# Patient Record
Sex: MALE | Race: OTHER | NOT HISPANIC OR LATINO | URBAN - METROPOLITAN AREA
[De-identification: names, ages, dates, MRNs, and addresses within clinical notes are randomized per-mention and may not be internally consistent; named-entity substitution may affect disease eponyms.]

---

## 2020-03-29 ENCOUNTER — INPATIENT (INPATIENT)
Facility: HOSPITAL | Age: 70
LOS: 4 days | Discharge: HOME | End: 2020-04-03
Attending: STUDENT IN AN ORGANIZED HEALTH CARE EDUCATION/TRAINING PROGRAM | Admitting: STUDENT IN AN ORGANIZED HEALTH CARE EDUCATION/TRAINING PROGRAM
Payer: MEDICARE

## 2020-03-29 VITALS
SYSTOLIC BLOOD PRESSURE: 119 MMHG | HEART RATE: 103 BPM | TEMPERATURE: 102 F | DIASTOLIC BLOOD PRESSURE: 65 MMHG | RESPIRATION RATE: 18 BRPM | OXYGEN SATURATION: 94 %

## 2020-03-29 DIAGNOSIS — R05 COUGH: ICD-10-CM

## 2020-03-29 LAB
ALBUMIN SERPL ELPH-MCNC: 3.7 G/DL — SIGNIFICANT CHANGE UP (ref 3.5–5.2)
ALP SERPL-CCNC: 51 U/L — SIGNIFICANT CHANGE UP (ref 30–115)
ALT FLD-CCNC: 32 U/L — SIGNIFICANT CHANGE UP (ref 0–41)
ANION GAP SERPL CALC-SCNC: 16 MMOL/L — HIGH (ref 7–14)
AST SERPL-CCNC: 58 U/L — HIGH (ref 0–41)
BASE EXCESS BLDV CALC-SCNC: 0.4 MMOL/L — SIGNIFICANT CHANGE UP (ref -2–2)
BASOPHILS # BLD AUTO: 0 K/UL — SIGNIFICANT CHANGE UP (ref 0–0.2)
BASOPHILS NFR BLD AUTO: 0 % — SIGNIFICANT CHANGE UP (ref 0–1)
BILIRUB SERPL-MCNC: 0.7 MG/DL — SIGNIFICANT CHANGE UP (ref 0.2–1.2)
BUN SERPL-MCNC: 16 MG/DL — SIGNIFICANT CHANGE UP (ref 10–20)
CA-I SERPL-SCNC: 1.1 MMOL/L — LOW (ref 1.12–1.3)
CALCIUM SERPL-MCNC: 8.2 MG/DL — LOW (ref 8.5–10.1)
CHLORIDE SERPL-SCNC: 98 MMOL/L — SIGNIFICANT CHANGE UP (ref 98–110)
CO2 SERPL-SCNC: 19 MMOL/L — SIGNIFICANT CHANGE UP (ref 17–32)
CREAT SERPL-MCNC: 0.8 MG/DL — SIGNIFICANT CHANGE UP (ref 0.7–1.5)
EOSINOPHIL # BLD AUTO: 0 K/UL — SIGNIFICANT CHANGE UP (ref 0–0.7)
EOSINOPHIL NFR BLD AUTO: 0 % — SIGNIFICANT CHANGE UP (ref 0–8)
FLU A RESULT: NEGATIVE — SIGNIFICANT CHANGE UP
FLU A RESULT: NEGATIVE — SIGNIFICANT CHANGE UP
FLUAV AG NPH QL: NEGATIVE — SIGNIFICANT CHANGE UP
FLUBV AG NPH QL: NEGATIVE — SIGNIFICANT CHANGE UP
GAS PNL BLDV: 132 MMOL/L — LOW (ref 136–145)
GAS PNL BLDV: SIGNIFICANT CHANGE UP
GLUCOSE SERPL-MCNC: 138 MG/DL — HIGH (ref 70–99)
HCO3 BLDV-SCNC: 23 MMOL/L — SIGNIFICANT CHANGE UP (ref 22–29)
HCT VFR BLD CALC: 40.4 % — LOW (ref 42–52)
HCT VFR BLDA CALC: 44.9 % — HIGH (ref 34–44)
HGB BLD CALC-MCNC: 14.7 G/DL — SIGNIFICANT CHANGE UP (ref 14–18)
HGB BLD-MCNC: 14.4 G/DL — SIGNIFICANT CHANGE UP (ref 14–18)
IMM GRANULOCYTES NFR BLD AUTO: 0.3 % — SIGNIFICANT CHANGE UP (ref 0.1–0.3)
LACTATE BLDV-MCNC: 1.3 MMOL/L — SIGNIFICANT CHANGE UP (ref 0.5–1.6)
LYMPHOCYTES # BLD AUTO: 0.23 K/UL — LOW (ref 1.2–3.4)
LYMPHOCYTES # BLD AUTO: 3.3 % — LOW (ref 20.5–51.1)
MAGNESIUM SERPL-MCNC: 2 MG/DL — SIGNIFICANT CHANGE UP (ref 1.8–2.4)
MCHC RBC-ENTMCNC: 30.6 PG — SIGNIFICANT CHANGE UP (ref 27–31)
MCHC RBC-ENTMCNC: 35.6 G/DL — SIGNIFICANT CHANGE UP (ref 32–37)
MCV RBC AUTO: 85.8 FL — SIGNIFICANT CHANGE UP (ref 80–94)
MONOCYTES # BLD AUTO: 0.42 K/UL — SIGNIFICANT CHANGE UP (ref 0.1–0.6)
MONOCYTES NFR BLD AUTO: 6 % — SIGNIFICANT CHANGE UP (ref 1.7–9.3)
NEUTROPHILS # BLD AUTO: 6.34 K/UL — SIGNIFICANT CHANGE UP (ref 1.4–6.5)
NEUTROPHILS NFR BLD AUTO: 90.4 % — HIGH (ref 42.2–75.2)
NRBC # BLD: 0 /100 WBCS — SIGNIFICANT CHANGE UP (ref 0–0)
NT-PROBNP SERPL-SCNC: 130 PG/ML — SIGNIFICANT CHANGE UP (ref 0–300)
PCO2 BLDV: 31 MMHG — LOW (ref 41–51)
PH BLDV: 7.48 — HIGH (ref 7.26–7.43)
PLATELET # BLD AUTO: 197 K/UL — SIGNIFICANT CHANGE UP (ref 130–400)
PO2 BLDV: 100 MMHG — HIGH (ref 20–40)
POTASSIUM BLDV-SCNC: 3.7 MMOL/L — SIGNIFICANT CHANGE UP (ref 3.3–5.6)
POTASSIUM SERPL-MCNC: 4.5 MMOL/L — SIGNIFICANT CHANGE UP (ref 3.5–5)
POTASSIUM SERPL-SCNC: 4.5 MMOL/L — SIGNIFICANT CHANGE UP (ref 3.5–5)
PROT SERPL-MCNC: 7 G/DL — SIGNIFICANT CHANGE UP (ref 6–8)
RBC # BLD: 4.71 M/UL — SIGNIFICANT CHANGE UP (ref 4.7–6.1)
RBC # FLD: 12.1 % — SIGNIFICANT CHANGE UP (ref 11.5–14.5)
RSV RESULT: NEGATIVE — SIGNIFICANT CHANGE UP
RSV RNA RESP QL NAA+PROBE: NEGATIVE — SIGNIFICANT CHANGE UP
SAO2 % BLDV: 97 % — SIGNIFICANT CHANGE UP
SODIUM SERPL-SCNC: 133 MMOL/L — LOW (ref 135–146)
TROPONIN T SERPL-MCNC: <0.01 NG/ML — SIGNIFICANT CHANGE UP
WBC # BLD: 7.01 K/UL — SIGNIFICANT CHANGE UP (ref 4.8–10.8)
WBC # FLD AUTO: 7.01 K/UL — SIGNIFICANT CHANGE UP (ref 4.8–10.8)

## 2020-03-29 PROCEDURE — 93010 ELECTROCARDIOGRAM REPORT: CPT

## 2020-03-29 PROCEDURE — 71045 X-RAY EXAM CHEST 1 VIEW: CPT | Mod: 26

## 2020-03-29 PROCEDURE — 99285 EMERGENCY DEPT VISIT HI MDM: CPT

## 2020-03-29 RX ORDER — AZITHROMYCIN 500 MG/1
500 TABLET, FILM COATED ORAL EVERY 24 HOURS
Refills: 0 | Status: DISCONTINUED | OUTPATIENT
Start: 2020-03-29 | End: 2020-03-29

## 2020-03-29 RX ORDER — CEFTRIAXONE 500 MG/1
1000 INJECTION, POWDER, FOR SOLUTION INTRAMUSCULAR; INTRAVENOUS EVERY 24 HOURS
Refills: 0 | Status: DISCONTINUED | OUTPATIENT
Start: 2020-03-29 | End: 2020-04-01

## 2020-03-29 RX ORDER — CEFTRIAXONE 500 MG/1
1000 INJECTION, POWDER, FOR SOLUTION INTRAMUSCULAR; INTRAVENOUS ONCE
Refills: 0 | Status: COMPLETED | OUTPATIENT
Start: 2020-03-29 | End: 2020-03-29

## 2020-03-29 RX ORDER — AZITHROMYCIN 500 MG/1
500 TABLET, FILM COATED ORAL ONCE
Refills: 0 | Status: COMPLETED | OUTPATIENT
Start: 2020-03-29 | End: 2020-03-29

## 2020-03-29 RX ORDER — ENOXAPARIN SODIUM 100 MG/ML
40 INJECTION SUBCUTANEOUS DAILY
Refills: 0 | Status: DISCONTINUED | OUTPATIENT
Start: 2020-03-29 | End: 2020-04-03

## 2020-03-29 RX ORDER — HYDROXYCHLOROQUINE SULFATE 200 MG
400 TABLET ORAL EVERY 12 HOURS
Refills: 0 | Status: COMPLETED | OUTPATIENT
Start: 2020-03-29 | End: 2020-03-30

## 2020-03-29 RX ORDER — HYDROXYCHLOROQUINE SULFATE 200 MG
TABLET ORAL
Refills: 0 | Status: DISCONTINUED | OUTPATIENT
Start: 2020-03-29 | End: 2020-03-30

## 2020-03-29 RX ORDER — HYDROXYCHLOROQUINE SULFATE 200 MG
200 TABLET ORAL EVERY 12 HOURS
Refills: 0 | Status: DISCONTINUED | OUTPATIENT
Start: 2020-03-30 | End: 2020-03-30

## 2020-03-29 RX ORDER — ACETAMINOPHEN 500 MG
650 TABLET ORAL ONCE
Refills: 0 | Status: COMPLETED | OUTPATIENT
Start: 2020-03-29 | End: 2020-03-29

## 2020-03-29 RX ORDER — SODIUM CHLORIDE 9 MG/ML
1000 INJECTION INTRAMUSCULAR; INTRAVENOUS; SUBCUTANEOUS
Refills: 0 | Status: DISCONTINUED | OUTPATIENT
Start: 2020-03-29 | End: 2020-04-03

## 2020-03-29 RX ORDER — SODIUM CHLORIDE 9 MG/ML
1000 INJECTION, SOLUTION INTRAVENOUS ONCE
Refills: 0 | Status: COMPLETED | OUTPATIENT
Start: 2020-03-29 | End: 2020-03-29

## 2020-03-29 RX ADMIN — ENOXAPARIN SODIUM 40 MILLIGRAM(S): 100 INJECTION SUBCUTANEOUS at 22:57

## 2020-03-29 RX ADMIN — Medication 650 MILLIGRAM(S): at 08:47

## 2020-03-29 RX ADMIN — AZITHROMYCIN 255 MILLIGRAM(S): 500 TABLET, FILM COATED ORAL at 10:41

## 2020-03-29 RX ADMIN — SODIUM CHLORIDE 75 MILLILITER(S): 9 INJECTION INTRAMUSCULAR; INTRAVENOUS; SUBCUTANEOUS at 17:28

## 2020-03-29 RX ADMIN — CEFTRIAXONE 100 MILLIGRAM(S): 500 INJECTION, POWDER, FOR SOLUTION INTRAMUSCULAR; INTRAVENOUS at 10:10

## 2020-03-29 RX ADMIN — CEFTRIAXONE 1000 MILLIGRAM(S): 500 INJECTION, POWDER, FOR SOLUTION INTRAMUSCULAR; INTRAVENOUS at 10:37

## 2020-03-29 RX ADMIN — SODIUM CHLORIDE 1000 MILLILITER(S): 9 INJECTION, SOLUTION INTRAVENOUS at 10:37

## 2020-03-29 RX ADMIN — Medication 400 MILLIGRAM(S): at 22:58

## 2020-03-29 NOTE — ED PROVIDER NOTE - NS ED ROS FT
Constitutional: + fever, chills.  Eyes: No visual changes.  ENT: No hearing changes. No sore throat.  Neck: No neck pain or stiffness.  Cardiovascular: No chest pain, palpitations, edema.  Pulmonary: + SOB, cough. No hemoptysis.  Abdominal: No abdominal pain, nausea, vomiting, diarrhea.  : No dysuria, frequency.  Neuro: No headache, syncope, dizziness.  MS: No back pain. No calf pain/swelling.  Psych: No suicidal ideations.

## 2020-03-29 NOTE — H&P ADULT - NSHPLABSRESULTS_GEN_ALL_CORE
Complete Blood Count + Automated Diff (03.29.20 @ 09:14)    WBC Count: 7.01 K/uL    RBC Count: 4.71 M/uL    Hemoglobin: 14.4 g/dL    Hematocrit: 40.4 %    Mean Cell Volume: 85.8 fL    Mean Cell Hemoglobin: 30.6 pg    Mean Cell Hemoglobin Conc: 35.6 g/dL    Red Cell Distrib Width: 12.1 %    Platelet Count - Automated: 197 K/uL    Auto Neutrophil #: 6.34 K/uL    Auto Lymphocyte #: 0.23 K/uL    Auto Monocyte #: 0.42 K/uL    Auto Eosinophil #: 0.00 K/uL    Auto Basophil #: 0.00 K/uL    Auto Neutrophil %: 90.4: Differential percentages must be correlated with absolute numbers for  clinical significance. %    Auto Lymphocyte %: 3.3 %    Auto Monocyte %: 6.0 %    Auto Eosinophil %: 0.0 %    Auto Basophil %: 0.0 %    Auto Immature Granulocyte %: 0.3 %    Nucleated RBC: 0 /100 WBCs    Comprehensive Metabolic Panel (03.29.20 @ 09:14)    Sodium, Serum: 133 mmol/L    Potassium, Serum: 4.5: Slighty Hemolyzed use with Caution mmol/L    Chloride, Serum: 98 mmol/L    Carbon Dioxide, Serum: 19 mmol/L    Anion Gap, Serum: 16 mmol/L    Blood Urea Nitrogen, Serum: 16 mg/dL    Creatinine, Serum: 0.8 mg/dL    Glucose, Serum: 138 mg/dL    Calcium, Total Serum: 8.2 mg/dL    Protein Total, Serum: 7.0 g/dL    Albumin, Serum: 3.7 g/dL    Bilirubin Total, Serum: 0.7 mg/dL    Alkaline Phosphatase, Serum: 51 U/L    Aspartate Aminotransferase (AST/SGOT): 58: Hemolyzed. Interpret with caution U/L    Alanine Aminotransferase (ALT/SGPT): 32 U/L    eGFR if Non : 89: Interpretative comment  The units for eGFR are mL/min/1.73M2 (normalized body surface area). The  eGFR is calculated from a serum creatinine using the CKD-EPI equation.  Other variables required for calculation are race, age and sex. Among  patients with chronic kidney disease (CKD), the eGFR is useful in  determining the stage of disease according to KDOQI CKD classification.  All eGFR results are reported numerically with the following  interpretation.            < from: Xray Chest 1 View-PORTABLE IMMEDIATE (03.29.20 @ 09:58) >    Impression:      Peripheral patchy right mid to lowerlung field opacity. Findings are consistent with pneumonia in the proper clinical setting      < end of copied text >

## 2020-03-29 NOTE — H&P ADULT - ASSESSMENT
Patient is a 69 y/o M with no PMH presented with chief complaint of shortness of breath. Patient has been having fever, dry cough for the last 2 weeks.    ASSESSMENT AND PLAN:    #COVID 19  - ID consult   - O2 supplmentation to keep SpO2 > 92 %   - repeat chest x ray in AM or earlier if any signs of decompensation   - Follow-up immune hyperactivation panel : LDH, D-dimers, fibrinogen, ferritin, triglycerides, ESR, CRP, pro-calcitonin, blood cultures. Give one time dose of Tocilizumab if evidence of immune hyper-activation and decompensation / shock if OK with ID   - Consider Hydroxychloroquine 400 mg BID for 2 doses then 200 mg BID if OK with ID   - Continue IVF with LR @ 75 mL/h   - Tylenol 650 mg q6h PRN for fever   - Contact and Airborne precuations Patient is a 69 y/o M with no PMH presented with chief complaint of shortness of breath. Patient has been having fever, dry cough for the last 2 weeks with loss of appetite and SOB over the last week.     ASSESSMENT AND PLAN:    #COVID 19  - ID consult   - O2 supplementation to keep SpO2 > 92 %. currently on 2L NC  - Chest x ray showed right middle and lower lobe opacity  - repeat chest x ray in AM or earlier if any signs of decompensation   - Follow-up immune hyperactivation panel : LDH, D-dimers, fibrinogen, ferritin, triglycerides, ESR, CRP, pro-calcitonin, blood cultures.   - Started on Hydroxychloroquine 400 mg BID for 2 doses then 200 mg BID   - started on rocephin. EKG ordered  - Continue IVF with LR @ 75 mL/h   - Tylenol 650 mg q6h PRN for fever   - Contact and Airborne precautions    #Diet: Regular  #DVT ppx: lovenox  #GI ppx: not indicated  #Dispo: Acute

## 2020-03-29 NOTE — ED ADULT TRIAGE NOTE - CHIEF COMPLAINT QUOTE
brought to ED c/o difficulty breathing "for the last few days" ,decreased appetite, fatigue  unknown sick contacts

## 2020-03-29 NOTE — H&P ADULT - HISTORY OF PRESENT ILLNESS
Patient is a 69 y/o M with no PMH presented with chief complaint of shortness of breath. Patient has been having fever, dry cough for the last 2 weeks. Patient has been having shortness of breath on minimal exertion for the last week associated with high grade fevers, loss of appetite and myalgias. no c/o chest pain, hemoptysis. Patient has no travel outside the [place] in last few weeks. He only goes out for grocery shopping.   In ED, /65mm Hg, /min, 101.4F, 94% on RA. WBC 7 with 3% lymphocyte count. Flu panel negative. Chest x ray showed right middle and lower lobe opacity. COVID sent. s/p rocephin and azithromycin. Patient is a 69 y/o M with no PMH presented with chief complaint of shortness of breath. Patient has been having fever, dry cough for the last 2 weeks. Patient has been having shortness of breath on minimal exertion for the last week associated with high grade fevers, loss of appetite and myalgias. no c/o chest pain, hemoptysis. Patient has no travel outside the [place] in last few weeks but he traveled to [place] in September 2019. He only goes out for grocery shopping.   In ED, /65mm Hg, /min, 101.4F, 94% on RA. WBC 7 with 3% lymphocyte count. Flu panel negative. Chest x ray showed right middle and lower lobe opacity. COVID sent. s/p rocephin and azithromycin.

## 2020-03-29 NOTE — H&P ADULT - ATTENDING COMMENTS
Above note reviewed. Patient seen and examined   Patient was using oxygen. At the time of examinations. Oxygen were removed and patient did well without oxygen.   However patient is very lethargic and coughing using accessory muscle to breath.     Chest xray finding of bilateral pneumonia - C/W IV antibiotics for now and will monitor repeat chest xray tomorrow

## 2020-03-29 NOTE — ED PROVIDER NOTE - OBJECTIVE STATEMENT
Pt is a 69 y/o male with PMH of HTN, DLD who presents to ED for several days of SOB, moderate, constant, worse since onset. +cough, fever, weakness. No abd pain, n/v/d, chest pain. Hx obtained from pt and son.

## 2020-03-29 NOTE — ED ADULT NURSE REASSESSMENT NOTE - NS ED NURSE REASSESS COMMENT FT1
placed on full cm, placed on o2 2l nc for sats 90% on ra, now 97% on 2lnc - will continue to monitor

## 2020-03-29 NOTE — ED ADULT NURSE NOTE - OBJECTIVE STATEMENT
md HPI Objective Statement: Pt is a 71 y/o male with PMH of HTN, DLD who presents to ED for several days of SOB, moderate, constant, worse since onset. +cough, fever, weakness. No abd pain, n/v/d, chest pain. Hx obtained from pt and son.

## 2020-03-29 NOTE — ED ADULT NURSE NOTE - INTERVENTIONS DEFINITIONS
Call bell, personal items and telephone within reach/Instruct patient to call for assistance/Reinforce activity limits and safety measures with patient and family/Physically safe environment: no spills, clutter or unnecessary equipment

## 2020-03-29 NOTE — ED PROVIDER NOTE - CLINICAL SUMMARY MEDICAL DECISION MAKING FREE TEXT BOX
Pt with several medical problems presented to ED for SOB, cough, fever. LAbs, imaging obtained. Cultures sent, abx given. COVID testing sent, pt on isolation. Labs, imaging reviewed. Endorsed to medicine for further management.

## 2020-03-29 NOTE — ED PROVIDER NOTE - PHYSICAL EXAMINATION
Constitutional: Well developed, well nourished. NAD.  Head: Normocephalic, atraumatic.  Eyes: PERRL. EOMI.  ENT: No nasal discharge. Mucous membranes moist.  Neck: Supple. Painless ROM.  Cardiovascular: Normal S1, S2. Regular rate and rhythm. No murmurs, rubs, or gallops.  Pulmonary: Mild tachypnea. Lungs CTAB.  Abdominal: Soft. Nondistended. Nontender. No rebound, guarding, rigidity.  Extremities. Pelvis stable. No lower extremity edema, symmetric calves.  Skin: No rashes, cyanosis.  Neuro: AAOx3. No focal neurological deficits.  Psych: Normal mood. Normal affect.

## 2020-03-30 LAB
ALBUMIN SERPL ELPH-MCNC: 3.1 G/DL — LOW (ref 3.5–5.2)
ALP SERPL-CCNC: 46 U/L — SIGNIFICANT CHANGE UP (ref 30–115)
ALT FLD-CCNC: 27 U/L — SIGNIFICANT CHANGE UP (ref 0–41)
ANION GAP SERPL CALC-SCNC: 12 MMOL/L — SIGNIFICANT CHANGE UP (ref 7–14)
AST SERPL-CCNC: 41 U/L — SIGNIFICANT CHANGE UP (ref 0–41)
BILIRUB SERPL-MCNC: 0.4 MG/DL — SIGNIFICANT CHANGE UP (ref 0.2–1.2)
BUN SERPL-MCNC: 12 MG/DL — SIGNIFICANT CHANGE UP (ref 10–20)
CALCIUM SERPL-MCNC: 8.1 MG/DL — LOW (ref 8.5–10.1)
CHLORIDE SERPL-SCNC: 100 MMOL/L — SIGNIFICANT CHANGE UP (ref 98–110)
CO2 SERPL-SCNC: 24 MMOL/L — SIGNIFICANT CHANGE UP (ref 17–32)
CREAT SERPL-MCNC: 0.6 MG/DL — LOW (ref 0.7–1.5)
D DIMER BLD IA.RAPID-MCNC: 294 NG/ML DDU — HIGH (ref 0–230)
ERYTHROCYTE [SEDIMENTATION RATE] IN BLOOD: 8 MM/HR — SIGNIFICANT CHANGE UP (ref 0–10)
GLUCOSE SERPL-MCNC: 109 MG/DL — HIGH (ref 70–99)
HCT VFR BLD CALC: 38.7 % — LOW (ref 42–52)
HCV AB S/CO SERPL IA: 0.03 COI — SIGNIFICANT CHANGE UP
HCV AB SERPL-IMP: SIGNIFICANT CHANGE UP
HGB BLD-MCNC: 13.1 G/DL — LOW (ref 14–18)
LDH SERPL L TO P-CCNC: 246 U/L — HIGH (ref 50–242)
MAGNESIUM SERPL-MCNC: 2 MG/DL — SIGNIFICANT CHANGE UP (ref 1.8–2.4)
MCHC RBC-ENTMCNC: 29.1 PG — SIGNIFICANT CHANGE UP (ref 27–31)
MCHC RBC-ENTMCNC: 33.9 G/DL — SIGNIFICANT CHANGE UP (ref 32–37)
MCV RBC AUTO: 86 FL — SIGNIFICANT CHANGE UP (ref 80–94)
NRBC # BLD: 0 /100 WBCS — SIGNIFICANT CHANGE UP (ref 0–0)
PLATELET # BLD AUTO: 184 K/UL — SIGNIFICANT CHANGE UP (ref 130–400)
POTASSIUM SERPL-MCNC: 3.9 MMOL/L — SIGNIFICANT CHANGE UP (ref 3.5–5)
POTASSIUM SERPL-SCNC: 3.9 MMOL/L — SIGNIFICANT CHANGE UP (ref 3.5–5)
PROT SERPL-MCNC: 6 G/DL — SIGNIFICANT CHANGE UP (ref 6–8)
RBC # BLD: 4.5 M/UL — LOW (ref 4.7–6.1)
RBC # FLD: 12.1 % — SIGNIFICANT CHANGE UP (ref 11.5–14.5)
SARS-COV-2 RNA SPEC QL NAA+PROBE: SIGNIFICANT CHANGE UP
SODIUM SERPL-SCNC: 136 MMOL/L — SIGNIFICANT CHANGE UP (ref 135–146)
WBC # BLD: 5.02 K/UL — SIGNIFICANT CHANGE UP (ref 4.8–10.8)
WBC # FLD AUTO: 5.02 K/UL — SIGNIFICANT CHANGE UP (ref 4.8–10.8)

## 2020-03-30 PROCEDURE — 99223 1ST HOSP IP/OBS HIGH 75: CPT

## 2020-03-30 RX ORDER — ACETAMINOPHEN 500 MG
650 TABLET ORAL EVERY 6 HOURS
Refills: 0 | Status: DISCONTINUED | OUTPATIENT
Start: 2020-03-30 | End: 2020-04-03

## 2020-03-30 RX ADMIN — ENOXAPARIN SODIUM 40 MILLIGRAM(S): 100 INJECTION SUBCUTANEOUS at 12:48

## 2020-03-30 RX ADMIN — Medication 400 MILLIGRAM(S): at 05:14

## 2020-03-30 RX ADMIN — CEFTRIAXONE 100 MILLIGRAM(S): 500 INJECTION, POWDER, FOR SOLUTION INTRAMUSCULAR; INTRAVENOUS at 05:13

## 2020-03-30 NOTE — CONSULT NOTE ADULT - SUBJECTIVE AND OBJECTIVE BOX
KIERSTEN SANTANA  70y, Male  Allergy: No Known Allergies      All historical available data reviewed.    HPI:  Patient is a 71 y/o M with no PMH presented with chief complaint of shortness of breath. Patient has been having fever, dry cough for the last 2 weeks. Patient has been having shortness of breath on minimal exertion for the last week associated with high grade fevers, loss of appetite and myalgias. no c/o chest pain, hemoptysis. Patient has no travel outside the [place] in last few weeks but he traveled to [place] in September 2019. He only goes out for grocery shopping.   In ED, /65mm Hg, /min, 101.4F, 94% on RA. WBC 7 with 3% lymphocyte count. Flu panel negative. Chest x ray showed right middle and lower lobe opacity. COVID sent. s/p rocephin and azithromycin. (29 Mar 2020 16:22)    FAMILY HISTORY:    PAST MEDICAL & SURGICAL HISTORY:        VITALS:  T(F): 97.1, Max: 97.1 (03-29-20 @ 14:04)  HR: 70  BP: 115/76  RR: 22Vital Signs Last 24 Hrs  T(C): 36.2 (29 Mar 2020 14:04), Max: 36.2 (29 Mar 2020 14:04)  T(F): 97.1 (29 Mar 2020 14:04), Max: 97.1 (29 Mar 2020 14:04)  HR: 70 (29 Mar 2020 14:04) (70 - 97)  BP: 115/76 (29 Mar 2020 14:04) (109/72 - 115/76)  BP(mean): --  RR: 22 (29 Mar 2020 14:04) (20 - 22)  SpO2: 95% (29 Mar 2020 14:04) (91% - 97%)    TESTS & MEASUREMENTS:                        13.1   5.02  )-----------( 184      ( 30 Mar 2020 06:51 )             38.7     03-30    136  |  100  |  12  ----------------------------<  109<H>  3.9   |  24  |  0.6<L>    Ca    8.1<L>      30 Mar 2020 06:51  Mg     2.0     03-30    TPro  6.0  /  Alb  3.1<L>  /  TBili  0.4  /  DBili  x   /  AST  41  /  ALT  27  /  AlkPhos  46  03-30    LIVER FUNCTIONS - ( 30 Mar 2020 06:51 )  Alb: 3.1 g/dL / Pro: 6.0 g/dL / ALK PHOS: 46 U/L / ALT: 27 U/L / AST: 41 U/L / GGT: x                   RADIOLOGY & ADDITIONAL TESTS:  Personal review of radiological diagnostics performed  Echo and EKG results noted when applicable.     MEDICATIONS:  acetaminophen   Tablet .. 650 milliGRAM(s) Oral every 6 hours PRN  cefTRIAXone   IVPB 1000 milliGRAM(s) IV Intermittent every 24 hours  enoxaparin Injectable 40 milliGRAM(s) SubCutaneous daily  hydroxychloroquine   Oral   hydroxychloroquine 200 milliGRAM(s) Oral every 12 hours  sodium chloride 0.9%. 1000 milliLiter(s) IV Continuous <Continuous>      ANTIBIOTICS:  cefTRIAXone   IVPB 1000 milliGRAM(s) IV Intermittent every 24 hours  hydroxychloroquine   Oral   hydroxychloroquine 200 milliGRAM(s) Oral every 12 hours

## 2020-03-30 NOTE — PROGRESS NOTE ADULT - SUBJECTIVE AND OBJECTIVE BOX
Patient is a 71 y/o M with no PMH presented with chief complaint of shortness of breath.   Patient has been having fever, dry cough for the last 2 weeks. Patient has been having shortness of breath on minimal exertion for the last week associated with high grade fevers, loss of appetite and myalgias. no c/o chest pain, hemoptysis. Patient has no travel outside the [place] in last few weeks but he traveled to [place] in September 2019. He only goes out for grocery shopping.   He c/o cough with poor appetite and no sense of taste.  1st test for covid was neg on 3/29/2020.  He was seen by ID on 3/30, recommended no ABX -- Plaquenil and azithromycin were both d/c'd.  However, he is getting iv rocephin 1gm q24hr for CAP.    < from: Xray Chest 1 View-PORTABLE IMMEDIATE (03.29.20 @ 09:58) >    PROCEDURE DATE:  03/29/2020            INTERPRETATION:  Clinical History / Reason for exam: Cough and fever    Comparison : Chest radiograph none  Technique/Positioning: AP portable     Findings:    Support devices: None.    Cardiac/mediastinum/hilum: Tortuous aorta.    Lung parenchyma/Pleura: Peripheral patchy right mid and lower opacity. No pneumothorax    Skeleton/soft tissues: Unremarkable.    Impression:      Peripheral patchy right mid to lowerlung field opacity. Findings are consistent with pneumonia in the proper clinical setting    < end of copied text >    LABS:                          13.1   5.02  )-----------( 184      ( 30 Mar 2020 06:51 )             38.7     03-30    136  |  100  |  12  ----------------------------<  109<H>  3.9   |  24  |  0.6<L>    Ca    8.1<L>      30 Mar 2020 06:51  Mg     2.0     03-30    TPro  6.0  /  Alb  3.1<L>  /  TBili  0.4  /  DBili  x   /  AST  41  /  ALT  27  /  AlkPhos  46  03-30    PE:  VS  @16:30:      /73      HR 77      RR18    T96.1    pulse ox RA = 89%, pulse ox O2 via NC @ 2LPM = 99%  the patient is alert, oriented, NAD but is coughing a lot  Lungs:  + coarse rales RML and RLL, few rales LLL  Heart::   RR no murmur  Abdomen: soft, bs+, non-tender  Extremities:   no CCE    ---Discussed with hospitalist Dr. Krause re: covid negative swab on 3/29---since the patient is still symptomatic, he was swabbed again today to r/o covid      If negative, and patient still needs to be hospitalized for CAP, will discuss transfer to another unit that is covid neg.; if he is stable, covid neg and       remains afebrile, consider d/c home  ---In the meanwhile, continue IV rocephin and supportive rx  ---IV fluids since po intake is poor, he states he's only drinking water because nothing tastes right  ---monitor labs, VS, pulse ox---->try to wean off O2 if pulse ox remains in high 90's on O2

## 2020-03-31 LAB
ALBUMIN SERPL ELPH-MCNC: 3 G/DL — LOW (ref 3.5–5.2)
ALP SERPL-CCNC: 49 U/L — SIGNIFICANT CHANGE UP (ref 30–115)
ALT FLD-CCNC: 25 U/L — SIGNIFICANT CHANGE UP (ref 0–41)
ANION GAP SERPL CALC-SCNC: 12 MMOL/L — SIGNIFICANT CHANGE UP (ref 7–14)
AST SERPL-CCNC: 34 U/L — SIGNIFICANT CHANGE UP (ref 0–41)
BASOPHILS # BLD AUTO: 0 K/UL — SIGNIFICANT CHANGE UP (ref 0–0.2)
BASOPHILS NFR BLD AUTO: 0 % — SIGNIFICANT CHANGE UP (ref 0–1)
BILIRUB SERPL-MCNC: 0.5 MG/DL — SIGNIFICANT CHANGE UP (ref 0.2–1.2)
BUN SERPL-MCNC: 7 MG/DL — LOW (ref 10–20)
CALCIUM SERPL-MCNC: 8.1 MG/DL — LOW (ref 8.5–10.1)
CHLORIDE SERPL-SCNC: 103 MMOL/L — SIGNIFICANT CHANGE UP (ref 98–110)
CO2 SERPL-SCNC: 24 MMOL/L — SIGNIFICANT CHANGE UP (ref 17–32)
CREAT SERPL-MCNC: 0.6 MG/DL — LOW (ref 0.7–1.5)
CRP SERPL-MCNC: 7.35 MG/DL — HIGH (ref 0–0.4)
EOSINOPHIL # BLD AUTO: 0.05 K/UL — SIGNIFICANT CHANGE UP (ref 0–0.7)
EOSINOPHIL NFR BLD AUTO: 0.9 % — SIGNIFICANT CHANGE UP (ref 0–8)
GIANT PLATELETS BLD QL SMEAR: PRESENT — SIGNIFICANT CHANGE UP
GLUCOSE SERPL-MCNC: 99 MG/DL — SIGNIFICANT CHANGE UP (ref 70–99)
HCT VFR BLD CALC: 37.9 % — LOW (ref 42–52)
HGB BLD-MCNC: 13 G/DL — LOW (ref 14–18)
LYMPHOCYTES # BLD AUTO: 0.14 K/UL — LOW (ref 1.2–3.4)
LYMPHOCYTES # BLD AUTO: 2.6 % — LOW (ref 20.5–51.1)
MANUAL SMEAR VERIFICATION: SIGNIFICANT CHANGE UP
MCHC RBC-ENTMCNC: 30.2 PG — SIGNIFICANT CHANGE UP (ref 27–31)
MCHC RBC-ENTMCNC: 34.3 G/DL — SIGNIFICANT CHANGE UP (ref 32–37)
MCV RBC AUTO: 88.1 FL — SIGNIFICANT CHANGE UP (ref 80–94)
MONOCYTES # BLD AUTO: 0.64 K/UL — HIGH (ref 0.1–0.6)
MONOCYTES NFR BLD AUTO: 12.2 % — HIGH (ref 1.7–9.3)
MYELOCYTES NFR BLD: 1.7 % — HIGH (ref 0–0)
NEUTROPHILS # BLD AUTO: 4.13 K/UL — SIGNIFICANT CHANGE UP (ref 1.4–6.5)
NEUTROPHILS NFR BLD AUTO: 76.5 % — HIGH (ref 42.2–75.2)
NEUTS BAND # BLD: 1.7 % — SIGNIFICANT CHANGE UP (ref 0–6)
PLAT MORPH BLD: NORMAL — SIGNIFICANT CHANGE UP
PLATELET # BLD AUTO: 205 K/UL — SIGNIFICANT CHANGE UP (ref 130–400)
POTASSIUM SERPL-MCNC: 4 MMOL/L — SIGNIFICANT CHANGE UP (ref 3.5–5)
POTASSIUM SERPL-SCNC: 4 MMOL/L — SIGNIFICANT CHANGE UP (ref 3.5–5)
PROCALCITONIN SERPL-MCNC: 0.06 NG/ML — SIGNIFICANT CHANGE UP (ref 0.02–0.1)
PROT SERPL-MCNC: 6.1 G/DL — SIGNIFICANT CHANGE UP (ref 6–8)
RBC # BLD: 4.3 M/UL — LOW (ref 4.7–6.1)
RBC # FLD: 12.2 % — SIGNIFICANT CHANGE UP (ref 11.5–14.5)
RBC BLD AUTO: NORMAL — SIGNIFICANT CHANGE UP
SARS-COV-2 RNA SPEC QL NAA+PROBE: SIGNIFICANT CHANGE UP
SODIUM SERPL-SCNC: 139 MMOL/L — SIGNIFICANT CHANGE UP (ref 135–146)
VARIANT LYMPHS # BLD: 4.4 % — SIGNIFICANT CHANGE UP (ref 0–5)
WBC # BLD: 5.28 K/UL — SIGNIFICANT CHANGE UP (ref 4.8–10.8)
WBC # FLD AUTO: 5.28 K/UL — SIGNIFICANT CHANGE UP (ref 4.8–10.8)

## 2020-03-31 PROCEDURE — 99233 SBSQ HOSP IP/OBS HIGH 50: CPT

## 2020-03-31 RX ADMIN — ENOXAPARIN SODIUM 40 MILLIGRAM(S): 100 INJECTION SUBCUTANEOUS at 11:59

## 2020-03-31 RX ADMIN — CEFTRIAXONE 100 MILLIGRAM(S): 500 INJECTION, POWDER, FOR SOLUTION INTRAMUSCULAR; INTRAVENOUS at 06:01

## 2020-03-31 NOTE — PROGRESS NOTE ADULT - ASSESSMENT
· Assessment		  71 y/o M with no PMH presented with chief complaint of shortness of breath. Patient has been having fever, dry cough for the last 2 weeks with loss of appetite and SOB over the last week.     IMPRESSION:  COVID-19 : NG  -no PNA. Has minimal pulmonary complaints. Good oxygenation. CXR with no PNA  -symptoms of 2 weeks duration    RECOMMENDATIONS:  -see no need for inflammatory markers  -no ABx  -recall prn please

## 2020-03-31 NOTE — PROGRESS NOTE ADULT - SUBJECTIVE AND OBJECTIVE BOX
ESTHER, [facility]  70y, Male    All available historical data reviewed    OVERNIGHT EVENTS:    none  ROS:  General: Denies rigors, nightsweats  HEENT: Denies headache, rhinorrhea, sore throat, eye pain  CV: Denies CP, palpitations  PULM: Denies wheezing, hemoptysis  GI: Denies hematemesis, hematochezia, melena  : Denies discharge, hematuria  MSK: Denies arthralgias, myalgias  SKIN: Denies rash, lesions  NEURO: Denies paresthesias, weakness  PSYCH: Denies depression, anxiety    VITALS:  T(F): 98.4, Max: 98.4 (03-31-20 @ 00:55)  HR: 79  BP: 126/68  RR: 18Vital Signs Last 24 Hrs  T(C): 36.9 (31 Mar 2020 00:55), Max: 36.9 (31 Mar 2020 00:55)  T(F): 98.4 (31 Mar 2020 00:55), Max: 98.4 (31 Mar 2020 00:55)  HR: 79 (31 Mar 2020 00:55) (68 - 79)  BP: 126/68 (31 Mar 2020 00:55) (126/68 - 168/72)  BP(mean): --  RR: 18 (31 Mar 2020 00:55) (18 - 20)  SpO2: 99% (31 Mar 2020 00:55) (93% - 99%)    TESTS & MEASUREMENTS:                        13.1   5.02  )-----------( 184      ( 30 Mar 2020 06:51 )             38.7     03-30    136  |  100  |  12  ----------------------------<  109<H>  3.9   |  24  |  0.6<L>    Ca    8.1<L>      30 Mar 2020 06:51  Mg     2.0     03-30    TPro  6.0  /  Alb  3.1<L>  /  TBili  0.4  /  DBili  x   /  AST  41  /  ALT  27  /  AlkPhos  46  03-30    LIVER FUNCTIONS - ( 30 Mar 2020 06:51 )  Alb: 3.1 g/dL / Pro: 6.0 g/dL / ALK PHOS: 46 U/L / ALT: 27 U/L / AST: 41 U/L / GGT: x             Culture - Blood (collected 03-29-20 @ 09:14)  Source: .Blood Blood  Preliminary Report (03-30-20 @ 17:02):    No growth to date.    Culture - Blood (collected 03-29-20 @ 09:14)  Source: .Blood Blood  Preliminary Report (03-30-20 @ 17:02):    No growth to date.            RADIOLOGY & ADDITIONAL TESTS:  Personal review of radiological diagnostics performed  Echo and EKG results noted when applicable.     MEDICATIONS:  acetaminophen   Tablet .. 650 milliGRAM(s) Oral every 6 hours PRN  cefTRIAXone   IVPB 1000 milliGRAM(s) IV Intermittent every 24 hours  enoxaparin Injectable 40 milliGRAM(s) SubCutaneous daily  sodium chloride 0.9%. 1000 milliLiter(s) IV Continuous <Continuous>      ANTIBIOTICS:  cefTRIAXone   IVPB 1000 milliGRAM(s) IV Intermittent every 24 hours

## 2020-03-31 NOTE — PROGRESS NOTE ADULT - SUBJECTIVE AND OBJECTIVE BOX
The patient is doing well, no SOB; VS are stable    Vital Signs Last 24 Hrs  T(C): 37.1 (31 Mar 2020 09:58), Max: 37.1 (31 Mar 2020 09:58)  T(F): 98.8 (31 Mar 2020 09:58), Max: 98.8 (31 Mar 2020 09:58)  HR: 107 (31 Mar 2020 09:58) (68 - 107)  BP: 130/75 (31 Mar 2020 09:58) (126/68 - 168/72)  BP(mean): --  RR: 18 (31 Mar 2020 00:55) (18 - 18)  SpO2: 96% (31 Mar 2020 09:58) (93% - 99%)      LABS:  cret                        13.0   5.28  )-----------( 205      ( 31 Mar 2020 09:11 )             37.9     03-31    139  |  103  |  7<L>  ----------------------------<  99  4.0   |  24  |  0.6<L>    Ca    8.1<L>      31 Mar 2020 09:11  Mg     2.0     03-30    TPro  6.1  /  Alb  3.0<L>  /  TBili  0.5  /  DBili  x   /  AST  34  /  ALT  25  /  AlkPhos  49  03-31    < from: Xray Chest 1 View-PORTABLE IMMEDIATE (03.29.20 @ 09:58) >    PROCEDURE DATE:  03/29/2020        INTERPRETATION:  Clinical History / Reason for exam: Cough and fever    Comparison : Chest radiograph none  Technique/Positioning: AP portable     Findings:    Support devices: None.    Cardiac/mediastinum/hilum: Tortuous aorta.    Lung parenchyma/Pleura: Peripheral patchy right mid and lower opacity. No pneumothorax    Skeleton/soft tissues: Unremarkable.    Impression:      Peripheral patchy right mid to lowerlung field opacity. Findings are consistent with pneumonia in the proper clinical setting    < end of copied text >      From ID follow up today:  Assessment and Plan:   · Assessment		  · Assessment		  71 y/o M with no PMH presented with chief complaint of shortness of breath. Patient has been having fever, dry cough for the last 2 weeks with loss of appetite and SOB over the last week.     IMPRESSION:  COVID-19 : NG  -no PNA. Has minimal pulmonary complaints. Good oxygenation. CXR with no PNA  -symptoms of 2 weeks duration    RECOMMENDATIONS:  -see no need for inflammatory markers  -no ABx  -recall prn please    Assessment:     r/o COVID 19--2nd specimen sent yesterday 3/30 is still pending; if negative, and patient still needs   to be in hospital, will transfer to coved-negative unit; if he is stable, may be able to discharge The patient is doing well, no SOB; VS are stable    Vital Signs Last 24 Hrs  T(C): 37.1 (31 Mar 2020 09:58), Max: 37.1 (31 Mar 2020 09:58)  T(F): 98.8 (31 Mar 2020 09:58), Max: 98.8 (31 Mar 2020 09:58)  HR: 107 (31 Mar 2020 09:58) (68 - 107)  BP: 130/75 (31 Mar 2020 09:58) (126/68 - 168/72)  BP(mean): --  RR: 18 (31 Mar 2020 00:55) (18 - 18)  SpO2: 96% (31 Mar 2020 09:58) (93% - 99%)      LABS:  cret                        13.0   5.28  )-----------( 205      ( 31 Mar 2020 09:11 )             37.9     03-31    139  |  103  |  7<L>  ----------------------------<  99  4.0   |  24  |  0.6<L>    Ca    8.1<L>      31 Mar 2020 09:11  Mg     2.0     03-30    TPro  6.1  /  Alb  3.0<L>  /  TBili  0.5  /  DBili  x   /  AST  34  /  ALT  25  /  AlkPhos  49  03-31    < from: Xray Chest 1 View-PORTABLE IMMEDIATE (03.29.20 @ 09:58) >    PROCEDURE DATE:  03/29/2020        INTERPRETATION:  Clinical History / Reason for exam: Cough and fever    Comparison : Chest radiograph none  Technique/Positioning: AP portable     Findings:    Support devices: None.    Cardiac/mediastinum/hilum: Tortuous aorta.    Lung parenchyma/Pleura: Peripheral patchy right mid and lower opacity. No pneumothorax    Skeleton/soft tissues: Unremarkable.    Impression:      Peripheral patchy right mid to lowerlung field opacity. Findings are consistent with pneumonia in the proper clinical setting    < end of copied text >      From ID follow up today:  Assessment and Plan:   · Assessment		  · Assessment		  71 y/o M with no PMH presented with chief complaint of shortness of breath. Patient has been having fever, dry cough for the last 2 weeks with loss of appetite and SOB over the last week.     IMPRESSION:  COVID-19 : NG  -no PNA. Has minimal pulmonary complaints. Good oxygenation. CXR with no PNA  -symptoms of 2 weeks duration    RECOMMENDATIONS:  -see no need for inflammatory markers  -no ABx  -recall prn please    Assessment:     r/o COVID 19--2nd specimen sent yesterday 3/30 is still pending; if negative, and patient still needs   to be in hospital, will transfer to coved-negative unit; if he is stable, may be able to discharge    ADDENDUM 16:45--2nd Covid test was NEGATIVE today; the patient is being transferred to , a non-Covid unit

## 2020-03-31 NOTE — PROGRESS NOTE ADULT - SUBJECTIVE AND OBJECTIVE BOX
Patient is a 71 y/o M with no PMH presented with chief complaint of shortness of breath.     Patient has been having fever, dry cough for the last 2 weeks. Patient has been having shortness of breath on minimal exertion for the last week associated with high grade fevers, loss of appetite and myalgias. no c/o chest pain, hemoptysis. Patient has no travel outside the [place] in last few weeks but he traveled to [place] in September 2019. He only goes out for grocery shopping.     He c/o cough with poor appetite and no sense of  taste.  1st test for covid was neg on 3/29/2020.     He was seen by ID on 3/30, recommended no ABX -- Plaquenil and azithromycin were both d/c'd.    However, he is getting iv rocephin 1gm q24hr for CAP.        INTERPRETATION:  Clinical History / Reason for exam: Cough and fever    Comparison : Chest radiograph none  Technique/Positioning: AP portable     Findings:    Support devices: None.    Cardiac/mediastinum/hilum: Tortuous aorta.    Lung parenchyma/Pleura: Peripheral patchy right mid and lower opacity. No pneumothorax    Skeleton/soft tissues: Unremarkable.    Impression:      Peripheral patchy right mid to lowerlung field opacity. Findings are consistent with pneumonia in the proper clinical setting    < end of copied text >    LABS:                          13.1   5.02  )-----------( 184      ( 30 Mar 2020 06:51 )             38.7     03-30    136  |  100  |  12  ----------------------------<  109<H>  3.9   |  24  |  0.6<L>    Ca    8.1<L>      30 Mar 2020 06:51  Mg     2.0     03-30    TPro  6.0  /  Alb  3.1<L>  /  TBili  0.4  /  DBili  x   /  AST  41  /  ALT  27  /  AlkPhos  46  03-30      Vital Signs Last 24 Hrs  T(C): 37.1 (31 Mar 2020 09:58), Max: 37.1 (31 Mar 2020 09:58)  T(F): 98.8 (31 Mar 2020 09:58), Max: 98.8 (31 Mar 2020 09:58)  HR: 107 (31 Mar 2020 09:58) (68 - 107)  BP: 130/75 (31 Mar 2020 09:58) (126/68 - 168/72)  BP(mean): --  RR: 18 (31 Mar 2020 00:55) (18 - 18)  SpO2: 96% (31 Mar 2020 09:58) (93% - 99%)    Physical exam -     CNS - AAAOX3  CVS - No murmur noted.   Resp - Clear bilaterally  Ext - No edema noted.   HEENT - WNL   GI - Non distended, non tender. bowel sound positive

## 2020-03-31 NOTE — PROGRESS NOTE ADULT - ASSESSMENT
#COVID 19 - negative   Acute Hypoxic respiratory failure. Titrating oxygen down. This morning patient is on 2 liter nasal cannula. Staying more than 90% saturation     - Chest x ray showed right middle and lower lobe opacity  - repeat chest x ray in AM or earlier if any signs of decompensation   - started on Rocephin on admission   - Continue IVF with LR @ 75 mL/h   - Tylenol 650 mg q6h PRN for fever   -  D/C Hydroxychloroquine     #Diet: Regular    Transfer to regular floor  D/C Isolation   D/C plan home tomorrow once patient oxygenation improves. Patient aware

## 2020-04-01 PROCEDURE — 71045 X-RAY EXAM CHEST 1 VIEW: CPT | Mod: 26

## 2020-04-01 PROCEDURE — 99232 SBSQ HOSP IP/OBS MODERATE 35: CPT

## 2020-04-01 RX ORDER — AZITHROMYCIN 500 MG/1
500 TABLET, FILM COATED ORAL DAILY
Refills: 0 | Status: DISCONTINUED | OUTPATIENT
Start: 2020-04-01 | End: 2020-04-03

## 2020-04-01 RX ORDER — ALBUTEROL 90 UG/1
2.5 AEROSOL, METERED ORAL ONCE
Refills: 0 | Status: DISCONTINUED | OUTPATIENT
Start: 2020-04-01 | End: 2020-04-02

## 2020-04-01 RX ADMIN — CEFTRIAXONE 100 MILLIGRAM(S): 500 INJECTION, POWDER, FOR SOLUTION INTRAMUSCULAR; INTRAVENOUS at 05:43

## 2020-04-01 RX ADMIN — Medication 600 MILLIGRAM(S): at 17:39

## 2020-04-01 NOTE — CONSULT NOTE ADULT - SUBJECTIVE AND OBJECTIVE BOX
HPI:  Patient is a 71 y/o M with no PMH presented with chief complaint of shortness of breath. Patient has been having fever, dry cough for the last 2 weeks. Patient has been having shortness of breath on minimal exertion for the last week associated with high grade fevers, loss of appetite and myalgias. no c/o chest pain, hemoptysis. Patient has no travel outside the [place] in last few weeks but he traveled to [place] in September 2019. He only goes out for grocery shopping.   In ED, /65mm Hg, /min, 101.4F, 94% on RA. WBC 7 with 3% lymphocyte count. Flu panel negative. Chest x ray showed right middle and lower lobe opacity. COVID sent. s/p rocephin and azithromycin. (29 Mar 2020 16:22)      PAST MEDICAL & SURGICAL HISTORY:      Hospital Course:    TODAY'S SUBJECTIVE & REVIEW OF SYMPTOMS:     Constitutional WNL   Cardio WNL   Resp WNL   GI WNL  Heme WNL  Endo WNL  Skin WNL  MSK Weakness  Neuro WNL  Cognitive WNL  Psych WNL      MEDICATIONS  (STANDING):  ALBUTerol    0.083%. 2.5 milliGRAM(s) Nebulizer once  azithromycin   Tablet 500 milliGRAM(s) Oral daily  enoxaparin Injectable 40 milliGRAM(s) SubCutaneous daily  guaiFENesin  milliGRAM(s) Oral every 12 hours  sodium chloride 0.9%. 1000 milliLiter(s) (75 mL/Hr) IV Continuous <Continuous>    MEDICATIONS  (PRN):  acetaminophen   Tablet .. 650 milliGRAM(s) Oral every 6 hours PRN Temp greater or equal to 38C (100.4F), Moderate Pain (4 - 6)      FAMILY HISTORY:      Allergies    No Known Allergies    Intolerances        SOCIAL HISTORY:    [  ] Etoh  [  ] Smoking  [  ] Substance abuse     Home Environment:  [ x ] Home Alone  [  ] Lives with Family  [  ] Home Health Aid    Dwelling:  [  ] Apartment  [ x ] Private House  [  ] Adult Home  [  ] Skilled Nursing Facility      [  ] Short Term  [  ] Long Term  [ x ] Stairs       Elevator [  ]    FUNCTIONAL STATUS PTA: (Check all that apply)  Ambulation: [  x ]Independent    [  ] Dependent     [  ] Non-Ambulatory  Assistive Device: [  ] SA Cane  [  ]  Q Cane  [  ] Walker  [  ]  Wheelchair  ADL : [ x ] Independent  [  ]  Dependent       Vital Signs Last 24 Hrs  T(C): 36 (01 Apr 2020 12:23), Max: 36 (01 Apr 2020 12:23)  T(F): 96.8 (01 Apr 2020 12:23), Max: 96.8 (01 Apr 2020 12:23)  HR: 85 (01 Apr 2020 12:23) (83 - 85)  BP: 114/68 (01 Apr 2020 12:23) (114/68 - 131/78)  BP(mean): --  RR: 18 (01 Apr 2020 12:23) (18 - 19)  SpO2: 98% (31 Mar 2020 19:54) (98% - 98%)      PHYSICAL EXAM: Alert & Oriented X3  GENERAL: NAD, well-groomed, well-developed  HEAD:  Atraumatic, Normocephalic  CHEST/LUNG: Clear   HEART: S1S2+  ABDOMEN: Soft, Nontender  EXTREMITIES:  no calf tenderness    NERVOUS SYSTEM:  Cranial Nerves 2-12 intact [  ] Abnormal  [  ]  ROM: WFL all extremities [x  ]  Abnormal [  ]  Motor Strength: WFL all extremities  [x  ]  Abnormal [  ]  Sensation: intact to light touch [ x ] Abnormal [  ]  Reflexes: Symmetric [  ]  Abnormal [  ]    FUNCTIONAL STATUS:  Bed Mobility: Independent [  ]  Supervision [x  ]  Needs Assistance [  ]  N/A [  ]  Transfers: Independent [  ]  Supervision [ x ]  Needs Assistance [  ]  N/A [  ]   Ambulation: Independent [  ]  Supervision [  ]  Needs Assistance [  ]  N/A [  ]  ADL: Independent [  ] Requires Assistance [  ] N/A [  ]      LABS:                        13.0   5.28  )-----------( 205      ( 31 Mar 2020 09:11 )             37.9     03-31    139  |  103  |  7<L>  ----------------------------<  99  4.0   |  24  |  0.6<L>    Ca    8.1<L>      31 Mar 2020 09:11    TPro  6.1  /  Alb  3.0<L>  /  TBili  0.5  /  DBili  x   /  AST  34  /  ALT  25  /  AlkPhos  49  03-31          RADIOLOGY & ADDITIONAL STUDIES:    Assesment:

## 2020-04-01 NOTE — PROGRESS NOTE ADULT - SUBJECTIVE AND OBJECTIVE BOX
Patient is a 70y old  Male who presents with a chief complaint of cough, shortness of breath (31 Mar 2020 13:49)  Patient has been having fever, dry cough for the last 2 weeks. Patient has been having shortness of breath on minimal exertion for the last week associated with high grade fevers, loss of appetite and myalgias. no c/o chest pain, hemoptysis. Patient has no travel outside the [place] in last few weeks but he traveled to [place] in September 2019. He only goes out for grocery shopping.   He c/o cough with poor appetite and no sense of  taste.  1st test for covid was neg on 3/29/2020.   He was seen by ID on 3/30, recommended no ABX -- Plaquenil and azithromycin were both d/c'd.  However, he is getting iv rocephin 1gm q24hr for CAP.  Today pt is c/o SOB while moving in bed, still on oxygen.    ALLERGIES:  No Known Allergies    VITALS:   T(C): 35.6 (01 Apr 2020 05:20), Max: 35.6 (01 Apr 2020 05:20)  T(F): 96 (01 Apr 2020 05:20), Max: 96 (01 Apr 2020 05:20)  HR: 83 (01 Apr 2020 05:20) (83 - 83)  BP: 131/78 (01 Apr 2020 05:20) (131/78 - 131/78)  BP(mean): --  RR: 18 (01 Apr 2020 05:20) (18 - 19)  SpO2: 98% (31 Mar 2020 19:54) (98% - 98%)    PHYSICAL EXAM:  GEN: No acute distress  HEENT: RAJ  LUNGS: Clear to auscultation bilaterally   HEART: S1/S2 present. RRR.   ABD: Soft, non-tender, non-distended. Bowel sounds present  EXT: non edematous, non erythematous  NEURO: AAOX3, no focal neuro deficit     LABS:                    13.0   5.28  )-----------( 205      ( 31 Mar 2020 09:11 )             37.9     03-31    139  |  103  |  7<L>  ----------------------------<  99  4.0   |  24  |  0.6<L>    Ca    8.1<L>      31 Mar 2020 09:11    TPro  6.1  /  Alb  3.0<L>  /  TBili  0.5  /  DBili  x   /  AST  34  /  ALT  25  /  AlkPhos  49  03-31    Cultures:    Culture - Blood (03.29.20 @ 09:14)    Specimen Source: .Blood Blood    Culture Results:   No growth to date.    COVID-19 PCR . (03.30.20 @ 19:25)    COVID-19 PCR: NotDetec: This test has been validated by [employer] to be accurate;  though it has not been FDA cleared/approved by the usual pathway.  As with all laboratory tests, results should be correlated with clinical  findings.    RADIOLOGY:  < from: Xray Chest 1 View-PORTABLE IMMEDIATE (03.29.20 @ 09:58) >  Impression:      Peripheral patchy right mid to lower lung field opacity. Findings are consistent with pneumonia in the proper clinical setting    < end of copied text >    MEDICATIONS  (STANDING):  cefTRIAXone   IVPB 1000 milliGRAM(s) IV Intermittent every 24 hours  enoxaparin Injectable 40 milliGRAM(s) SubCutaneous daily  sodium chloride 0.9%. 1000 milliLiter(s) (75 mL/Hr) IV Continuous <Continuous>    MEDICATIONS  (PRN):  acetaminophen   Tablet .. 650 milliGRAM(s) Oral every 6 hours PRN Temp greater or equal to 38C (100.4F), Moderate Pain (4 - 6)

## 2020-04-01 NOTE — CONSULT NOTE ADULT - ASSESSMENT
IMPRESSION: Rehab of gait dysfunction    PRECAUTIONS: [  ] Cardiac  [  ] Respiratory  [  ] Seizures [  ] Contact Isolation  [  ] Droplet Isolation  [  ] Other    Weight Bearing Status:     RECOMMENDATION:    Out of Bed to Chair     DVT/Decubiti Prophylaxis    REHAB PLAN:     [ x  ] Bedside P/T 3-5 times a week   [   ]   Bedside O/T  2-3 times a week             [   ] No Rehab Therapy Indicated                   [   ]  Speech Therapy   Conditioning/ROM                                    ADL  Bed Mobility                                               Conditioning/ROM  Transfers                                                     Bed Mobility  Sitting /Standing Balance                         Transfers                                        Gait Training                                               Sitting/Standing Balance  Stair Training [   ]Applicable                    Home equipment Eval                                                                        Splinting  [   ] Only      GOALS:   ADL   [   ]   Independent                    Transfers  [  x ] Independent                          Ambulation  [  x ] Independent     [  x  ] With device                            [   ]  CG                                                         [   ]  CG                                                                  [   ] CG                            [    ] Min A                                                   [   ] Min A                                                              [   ] Min  A          DISCHARGE PLAN:   [   ]  Good candidate for Intensive Rehabilitation/Hospital based                                             Will tolerate 3hrs Intensive Rehab Daily                                       [    ]  Short Term Rehab in Skilled Nursing Facility                                       [ x   ]  Home with Outpatient or  services                                         [    ]  Possible Candidate for Intensive Hospital based Rehab
71 y/o M with no PMH presented with chief complaint of shortness of breath. Patient has been having fever, dry cough for the last 2 weeks with loss of appetite and SOB over the last week.     IMPRESSION:  COVID-19 :even if positive pt does not need any ABX  -no PNA. Has minimal pulmonary complaints. Good oxygenation. CXR with no PNA  -symptoms of 2 weeks duration    RECOMMENDATIONS:  -see no need for inflammatory markers  -no ABx

## 2020-04-01 NOTE — PROGRESS NOTE ADULT - ASSESSMENT
Patient is a 70y old  Male who presents with a chief complaint of cough, shortness of breath (31 Mar 2020 13:49)    #COVID 19 - negative     ***PATIENT IS COVID NEGATIVE FROM 3/29/2020***  ***COVID SWAB NEGATIVE ON REPEAT AT 3/30/2020***      Acute Hypoxic respiratory failure. Titrating oxygen down. This morning patient was on 1L 96%  nasal cannula. Wean off today and if tolerated will discharge    - Chest x ray showed right middle and lower lobe opacity  - started on Rocephin on admission, will stop today  - Continue IVF with LR @ 75 mL/h, will stop on discharge   - Tylenol 650 mg q6h PRN for fever   -  D/C Hydroxychloroquine   - Will reassess o2 sat on RM and ambulation and discharge   - ID : No need of ABx, No PNA  -symptoms of 2 weeks duration    RECOMMENDATIONS:  -see no need for inflammatory markers  -no ABx  #Diet: Regular    ***PATIENT IS COVID NEGATIVE FROM 3/29/2020***  ***COVID SWAB REPEATED ON 3/30/2020***

## 2020-04-01 NOTE — PROGRESS NOTE ADULT - ASSESSMENT
Patient is a 70y old  Male who presents with a chief complaint of cough, shortness of breath, COVID 19 - negative   PATIENT IS COVID NEGATIVE FROM 3/29/2020  COVID SWAB NEGATIVE ON REPEAT AT 3/30/2020    A/P   # Acute Hypoxic respiratory failure/ PNA ( viral vs community acquired ) / COVID 19 was ruled out   - supplement oxygen , monitor pulse Ox, titrate oxygen down   - check pulse Ox on RA at rest and ambulation   - c/w Rocephin while in the hosptial   - start Albuterol Q 4 hours, Mucinex   - consulted by ID, off  Hydroxychloroquine and Zithromax   - supportive/ symptomatic treatment   - Tylenol for fever     # DVT prophylaxis   #Progress Note Handoff  Pending (specify):  titrate down/off oxygen, start Albuterol, Mucinex, check pulse Ox on RA at rest and ambulation   Family discussion: n/a, I spoke with pt, he agreed with a plan of care   Disposition: anticipate discharge in 24 hours

## 2020-04-01 NOTE — PROGRESS NOTE ADULT - SUBJECTIVE AND OBJECTIVE BOX
LENGTH OF HOSPITAL STAY: 3d    CHIEF COMPLAINT:   Patient is a 70y old  Male who presents with a chief complaint of cough, shortness of breath (31 Mar 2020 13:49)      Overnight events:    No acute events overnight    ALLERGIES:  No Known Allergies    MEDICATIONS:  STANDING MEDICATIONS  cefTRIAXone   IVPB 1000 milliGRAM(s) IV Intermittent every 24 hours  enoxaparin Injectable 40 milliGRAM(s) SubCutaneous daily  sodium chloride 0.9%. 1000 milliLiter(s) IV Continuous <Continuous>    PRN MEDICATIONS  acetaminophen   Tablet .. 650 milliGRAM(s) Oral every 6 hours PRN    VITALS:   T(F): 96  HR: 83  BP: 131/78  RR: 18  SpO2: 98%    LABS:                        13.0   5.28  )-----------( 205      ( 31 Mar 2020 09:11 )             37.9     03-31    139  |  103  |  7<L>  ----------------------------<  99  4.0   |  24  |  0.6<L>    Ca    8.1<L>      31 Mar 2020 09:11    TPro  6.1  /  Alb  3.0<L>  /  TBili  0.5  /  DBili  x   /  AST  34  /  ALT  25  /  AlkPhos  49  03-31                    Cultures:      RADIOLOGY:    PHYSICAL EXAM:  GEN: No acute distress  HEENT: RAJ  LUNGS: Clear to auscultation bilaterally   HEART: S1/S2 present. RRR.   ABD: Soft, non-tender, non-distended. Bowel sounds present  EXT: non edematous, non erythematous  NEURO: AAOX3

## 2020-04-02 LAB
ALBUMIN SERPL ELPH-MCNC: 2.8 G/DL — LOW (ref 3.5–5.2)
ALP SERPL-CCNC: 49 U/L — SIGNIFICANT CHANGE UP (ref 30–115)
ALT FLD-CCNC: 19 U/L — SIGNIFICANT CHANGE UP (ref 0–41)
ANION GAP SERPL CALC-SCNC: 12 MMOL/L — SIGNIFICANT CHANGE UP (ref 7–14)
AST SERPL-CCNC: 25 U/L — SIGNIFICANT CHANGE UP (ref 0–41)
BASOPHILS # BLD AUTO: 0.01 K/UL — SIGNIFICANT CHANGE UP (ref 0–0.2)
BASOPHILS NFR BLD AUTO: 0.2 % — SIGNIFICANT CHANGE UP (ref 0–1)
BILIRUB SERPL-MCNC: 0.7 MG/DL — SIGNIFICANT CHANGE UP (ref 0.2–1.2)
BUN SERPL-MCNC: 6 MG/DL — LOW (ref 10–20)
CALCIUM SERPL-MCNC: 8.1 MG/DL — LOW (ref 8.5–10.1)
CHLORIDE SERPL-SCNC: 101 MMOL/L — SIGNIFICANT CHANGE UP (ref 98–110)
CO2 SERPL-SCNC: 25 MMOL/L — SIGNIFICANT CHANGE UP (ref 17–32)
CREAT SERPL-MCNC: 0.6 MG/DL — LOW (ref 0.7–1.5)
CULTURE RESULTS: SIGNIFICANT CHANGE UP
CULTURE RESULTS: SIGNIFICANT CHANGE UP
EOSINOPHIL # BLD AUTO: 0.07 K/UL — SIGNIFICANT CHANGE UP (ref 0–0.7)
EOSINOPHIL NFR BLD AUTO: 1.6 % — SIGNIFICANT CHANGE UP (ref 0–8)
GLUCOSE SERPL-MCNC: 97 MG/DL — SIGNIFICANT CHANGE UP (ref 70–99)
HCT VFR BLD CALC: 36.4 % — LOW (ref 42–52)
HGB BLD-MCNC: 12.3 G/DL — LOW (ref 14–18)
IMM GRANULOCYTES NFR BLD AUTO: 0.7 % — HIGH (ref 0.1–0.3)
LYMPHOCYTES # BLD AUTO: 0.66 K/UL — LOW (ref 1.2–3.4)
LYMPHOCYTES # BLD AUTO: 15.1 % — LOW (ref 20.5–51.1)
MCHC RBC-ENTMCNC: 29.8 PG — SIGNIFICANT CHANGE UP (ref 27–31)
MCHC RBC-ENTMCNC: 33.8 G/DL — SIGNIFICANT CHANGE UP (ref 32–37)
MCV RBC AUTO: 88.1 FL — SIGNIFICANT CHANGE UP (ref 80–94)
MONOCYTES # BLD AUTO: 0.41 K/UL — SIGNIFICANT CHANGE UP (ref 0.1–0.6)
MONOCYTES NFR BLD AUTO: 9.4 % — HIGH (ref 1.7–9.3)
NEUTROPHILS # BLD AUTO: 3.19 K/UL — SIGNIFICANT CHANGE UP (ref 1.4–6.5)
NEUTROPHILS NFR BLD AUTO: 73 % — SIGNIFICANT CHANGE UP (ref 42.2–75.2)
NRBC # BLD: 0 /100 WBCS — SIGNIFICANT CHANGE UP (ref 0–0)
PLATELET # BLD AUTO: 220 K/UL — SIGNIFICANT CHANGE UP (ref 130–400)
POTASSIUM SERPL-MCNC: 4 MMOL/L — SIGNIFICANT CHANGE UP (ref 3.5–5)
POTASSIUM SERPL-SCNC: 4 MMOL/L — SIGNIFICANT CHANGE UP (ref 3.5–5)
PROT SERPL-MCNC: 5.8 G/DL — LOW (ref 6–8)
RBC # BLD: 4.13 M/UL — LOW (ref 4.7–6.1)
RBC # FLD: 12 % — SIGNIFICANT CHANGE UP (ref 11.5–14.5)
SODIUM SERPL-SCNC: 138 MMOL/L — SIGNIFICANT CHANGE UP (ref 135–146)
SPECIMEN SOURCE: SIGNIFICANT CHANGE UP
SPECIMEN SOURCE: SIGNIFICANT CHANGE UP
WBC # BLD: 4.37 K/UL — LOW (ref 4.8–10.8)
WBC # FLD AUTO: 4.37 K/UL — LOW (ref 4.8–10.8)

## 2020-04-02 PROCEDURE — 99232 SBSQ HOSP IP/OBS MODERATE 35: CPT

## 2020-04-02 RX ORDER — ACETAMINOPHEN 500 MG
2 TABLET ORAL
Qty: 40 | Refills: 0
Start: 2020-04-02 | End: 2020-04-06

## 2020-04-02 RX ORDER — ALBUTEROL 90 UG/1
2.5 AEROSOL, METERED ORAL EVERY 6 HOURS
Refills: 0 | Status: DISCONTINUED | OUTPATIENT
Start: 2020-04-02 | End: 2020-04-03

## 2020-04-02 RX ORDER — AZITHROMYCIN 500 MG/1
1 TABLET, FILM COATED ORAL
Qty: 4 | Refills: 0
Start: 2020-04-02 | End: 2020-04-05

## 2020-04-02 RX ADMIN — Medication 600 MILLIGRAM(S): at 17:19

## 2020-04-02 RX ADMIN — AZITHROMYCIN 500 MILLIGRAM(S): 500 TABLET, FILM COATED ORAL at 11:34

## 2020-04-02 RX ADMIN — Medication 600 MILLIGRAM(S): at 05:15

## 2020-04-02 RX ADMIN — SODIUM CHLORIDE 75 MILLILITER(S): 9 INJECTION INTRAMUSCULAR; INTRAVENOUS; SUBCUTANEOUS at 05:15

## 2020-04-02 RX ADMIN — ENOXAPARIN SODIUM 40 MILLIGRAM(S): 100 INJECTION SUBCUTANEOUS at 11:34

## 2020-04-02 NOTE — DISCHARGE NOTE PROVIDER - PROVIDER TOKENS
FREE:[LAST:[Kendell],FIRST:[Jesus],PHONE:[(   )    -],FAX:[(   )    -],ADDRESS:[PMD],FOLLOWUP:[1 week]]

## 2020-04-02 NOTE — DISCHARGE NOTE PROVIDER - HOSPITAL COURSE
Patient is a 71 y/o M with no PMH presented with chief complaint of shortness of breath. Patient has been having fever, dry cough for the last 2 weeks. Patient has been having shortness of breath on minimal exertion for the last week associated with high grade fevers, loss of appetite and myalgias. no c/o chest pain, hemoptysis. Patient has no travel outside the [place] in last few weeks but he traveled to [place] in September 2019. He only goes out for grocery shopping.     In ED, /65mm Hg, /min, 101.4F, 94% on RA. WBC 7 with 3% lymphocyte count. Flu panel negative. Chest x ray showed right middle and lower lobe opacity which is improving daily. COVID ruled out twice negative. Treated as viral pneumonia with Rocephin and Azithro. Infectious disease was consulted and no Abx were indicated for COVID-19. After O2 sat improvement he is discharged.

## 2020-04-02 NOTE — PHYSICAL THERAPY INITIAL EVALUATION ADULT - PERTINENT HX OF CURRENT PROBLEM, REHAB EVAL
Patient is a 70 year old male admitted for cough, fever, SOB. (-) COVID. As per patient's son, pt has had progressive cognitive decline over the past year which has worsened recently.

## 2020-04-02 NOTE — PHYSICAL THERAPY INITIAL EVALUATION ADULT - GENERAL OBSERVATIONS, REHAB EVAL
Patient seen from 9:15 to 10:15. Pt encountered in bed upon PT arrival in no apparent distress. + IV ( ALYSSA Graham disconnected and locked IV). Patient agreeable to evaluation. Pt demonstrated confused behavior and required repeated instructions throughout evaluation.

## 2020-04-02 NOTE — PROGRESS NOTE ADULT - SUBJECTIVE AND OBJECTIVE BOX
LENGTH OF HOSPITAL STAY: 4d    CHIEF COMPLAINT:   Patient is a 70y old  Male who presents with a chief complaint of cough, shortness of breath (01 Apr 2020 15:32)      Overnight events:    No acute events overnight    ALLERGIES:  No Known Allergies    MEDICATIONS:  STANDING MEDICATIONS  ALBUTerol    0.083%. 2.5 milliGRAM(s) Nebulizer once  azithromycin   Tablet 500 milliGRAM(s) Oral daily  enoxaparin Injectable 40 milliGRAM(s) SubCutaneous daily  guaiFENesin  milliGRAM(s) Oral every 12 hours  sodium chloride 0.9%. 1000 milliLiter(s) IV Continuous <Continuous>    PRN MEDICATIONS  acetaminophen   Tablet .. 650 milliGRAM(s) Oral every 6 hours PRN    VITALS:   T(F): 97.6  HR: 78  BP: 149/83  RR: 18  SpO2: 84%    LABS:                        12.3   4.37  )-----------( 220      ( 02 Apr 2020 05:44 )             36.4     04-02    138  |  101  |  6<L>  ----------------------------<  97  4.0   |  25  |  0.6<L>    Ca    8.1<L>      02 Apr 2020 05:44    TPro  5.8<L>  /  Alb  2.8<L>  /  TBili  0.7  /  DBili  x   /  AST  25  /  ALT  19  /  AlkPhos  49  04-02                    Cultures:      RADIOLOGY:    PHYSICAL EXAM:  GEN: No acute distress  HEENT: RAJ  LUNGS: Clear to auscultation bilaterally   HEART: S1/S2 present. RRR.   ABD: Soft, non-tender, non-distended. Bowel sounds present  EXT: non edematous, non erythematous  NEURO: AAOX3 LENGTH OF HOSPITAL STAY: 4d    CHIEF COMPLAINT:   Patient is a 70y old  Male who presents with a chief complaint of cough, shortness of breath (01 Apr 2020 15:32)  Saturation improved in am, resting comfortably in bed.    Overnight events:    No acute events overnight    ALLERGIES:  No Known Allergies    MEDICATIONS:  STANDING MEDICATIONS  ALBUTerol    0.083%. 2.5 milliGRAM(s) Nebulizer once  azithromycin   Tablet 500 milliGRAM(s) Oral daily  enoxaparin Injectable 40 milliGRAM(s) SubCutaneous daily  guaiFENesin  milliGRAM(s) Oral every 12 hours  sodium chloride 0.9%. 1000 milliLiter(s) IV Continuous <Continuous>    PRN MEDICATIONS  acetaminophen   Tablet .. 650 milliGRAM(s) Oral every 6 hours PRN    VITALS:   T(F): 97.6  HR: 78  BP: 149/83  RR: 18  SpO2: 84%    LABS:                        12.3   4.37  )-----------( 220      ( 02 Apr 2020 05:44 )             36.4     04-02    138  |  101  |  6<L>  ----------------------------<  97  4.0   |  25  |  0.6<L>    Ca    8.1<L>      02 Apr 2020 05:44    TPro  5.8<L>  /  Alb  2.8<L>  /  TBili  0.7  /  DBili  x   /  AST  25  /  ALT  19  /  AlkPhos  49  04-02                    Cultures:      RADIOLOGY:    PHYSICAL EXAM:  GEN: No acute distress  HEENT: RAJ  LUNGS: Clear to auscultation bilaterally   HEART: S1/S2 present. RRR.   ABD: Soft, non-tender, non-distended. Bowel sounds present  EXT: non edematous, non erythematous  NEURO: AAOX3

## 2020-04-02 NOTE — PROGRESS NOTE ADULT - ASSESSMENT
Patient is a 70y old  Male who presents with a chief complaint of cough, shortness of breath, COVID 19 - negative   PATIENT IS COVID NEGATIVE FROM 3/29/2020  COVID SWAB NEGATIVE ON REPEAT AT 3/30/2020    A/P   # Acute Hypoxic respiratory failure/ PNA ( viral vs community acquired ) / COVID 19 was ruled out   - supplement oxygen , monitor pulse Ox, titrate oxygen down ( pt desaturated on ambulation to 84% today)   - c/w Rocephin while in the hosptial   - on  Albuterol Q 4 hours, Mucinex   - consulted by ID, off  Hydroxychloroquine  - pt was restarted on Zithromax on 4/1 ( repeat CXR showed worsening PNA)   - supportive/ symptomatic treatment   - Tylenol for fever     # DVT prophylaxis     # Deconditioning / cognitive decline   - pt was able to participate with PT today, will benefit from SNF   - I called pt's son Luis, message left     #Progress Note Handoff  Pending (specify):  titrate down/off oxygen, supportive treatment   Family discussion: I called pt's son, message left   Disposition: anticipate discharge in 24 hours

## 2020-04-02 NOTE — PROGRESS NOTE ADULT - SUBJECTIVE AND OBJECTIVE BOX
Patient is a 70y old  Male who presents with a chief complaint of cough, shortness of breath (31 Mar 2020 13:49)  Patient has been having fever, dry cough for the last 2 weeks. Patient has been having shortness of breath on minimal exertion for the last week associated with high grade fevers, loss of appetite and myalgias. no c/o chest pain, hemoptysis. Patient has no travel outside the [place] in last few weeks but he traveled to [place] in September 2019. He only goes out for grocery shopping.   He c/o cough with poor appetite and no sense of  taste.  1st test for covid was neg on 3/29/2020.   He was seen by ID on 3/30, recommended no ABX -- Plaquenil and azithromycin were both d/c'd, but repeat CXR showed worsening PNA, pt was restarted on Zithromax on 4/1     Today pt feels much better today, he was able to participate with PT but pulse Ox went down to 84 % on RA on ambulation.     ALLERGIES:  No Known Allergies    VITALS:   T(C): 36.4 (02 Apr 2020 05:04), Max: 36.6 (01 Apr 2020 19:24)  T(F): 97.6 (02 Apr 2020 05:04), Max: 97.9 (01 Apr 2020 19:24)  HR: 78 (02 Apr 2020 05:04) (78 - 85)  BP: 149/83 (02 Apr 2020 05:04) (114/68 - 149/83)  BP(mean): --  RR: 18 (02 Apr 2020 05:04) (18 - 18)  SpO2: 84% (02 Apr 2020 10:08) (84% - 96%)    PHYSICAL EXAM:  GEN: No acute distress  HEENT: RAJ  LUNGS: Clear to auscultation bilaterally   HEART: S1/S2 present. RRR.   ABD: Soft, non-tender, non-distended. Bowel sounds present  EXT: non edematous, non erythematous  NEURO: AAOX3, no focal neuro deficit     LABS:                              12.3   4.37  )-----------( 220      ( 02 Apr 2020 05:44 )             36.4   04-02    138  |  101  |  6<L>  ----------------------------<  97  4.0   |  25  |  0.6<L>    Ca    8.1<L>      02 Apr 2020 05:44    TPro  5.8<L>  /  Alb  2.8<L>  /  TBili  0.7  /  DBili  x   /  AST  25  /  ALT  19  /  AlkPhos  49  04-02      Cultures:    Culture - Blood (03.29.20 @ 09:14)    Specimen Source: .Blood Blood    Culture Results:   No growth to date.    COVID-19 PCR . (03.30.20 @ 19:25)    COVID-19 PCR: NotDetec: This test has been validated by [employer] to be accurate;  though it has not been FDA cleared/approved by the usual pathway.  As with all laboratory tests, results should be correlated with clinical  findings.    RADIOLOGY:  < from: Xray Chest 1 View-PORTABLE IMMEDIATE (03.29.20 @ 09:58) >  Impression:      Peripheral patchy right mid to lower lung field opacity. Findings are consistent with pneumonia in the proper clinical setting    < from: Xray Chest 1 View-PORTABLE IMMEDIATE (04.01.20 @ 14:56) >  Impression:      Low lung volume. Interval increase in right lung opacities.    < end of copied text >        MEDICATIONS  (STANDING):  ALBUTerol    0.083% 2.5 milliGRAM(s) Nebulizer every 6 hours  azithromycin   Tablet 500 milliGRAM(s) Oral daily  enoxaparin Injectable 40 milliGRAM(s) SubCutaneous daily  guaiFENesin  milliGRAM(s) Oral every 12 hours  sodium chloride 0.9%. 1000 milliLiter(s) (75 mL/Hr) IV Continuous <Continuous>    MEDICATIONS  (PRN):  acetaminophen   Tablet .. 650 milliGRAM(s) Oral every 6 hours PRN Temp greater or equal to 38C (100.4F), Moderate Pain (4 - 6)

## 2020-04-02 NOTE — PHYSICAL THERAPY INITIAL EVALUATION ADULT - PERSONAL SAFETY AND JUDGMENT, REHAB EVAL
Pt attempted to lean over bed to reach for belongings on windowsill. PT educated pt on safety and always asking for assistance./at risk behaviors demonstrated

## 2020-04-02 NOTE — DISCHARGE NOTE PROVIDER - NSDCCPCAREPLAN_GEN_ALL_CORE_FT
PRINCIPAL DISCHARGE DIAGNOSIS  Diagnosis: Cough  Assessment and Plan of Treatment: You were diagnosed with viral pneumonia. You were ruled out for Corona COVID-19. We prescribed you antibiotics and cough medications. Please take it as prescribed and follow up with your PMD in a week

## 2020-04-02 NOTE — PHYSICAL THERAPY INITIAL EVALUATION ADULT - PHYSICAL ASSIST/NONPHYSICAL ASSIST: GAIT, REHAB EVAL
verbal cues/For safety, RW use, turns, and negotiating obstacles. Pt required frequent cues to slow down and re direction. Pt demonstrates impulsive behavior when ambulating.

## 2020-04-02 NOTE — PROGRESS NOTE ADULT - ASSESSMENT
Patient is a 70y old  Male who presents with a chief complaint of cough, shortness of breath     Acute Hypoxic respiratory failure - most likely viral   - symptoms of 2 weeks duration (COVID-19 negative twice)  - Titrating oxygen down. This morning patient was on RA and saturating 95%. Try to check O2 sat on ambulation and if tolerated will discharge  - Chest x ray showed right middle and lower lobe opacity  - started Azithromycin 500mg for 5 days from 4/1 to 4/5  - Continue IVF with LR @ 75 mL/h, will stop on discharge   - Tylenol 650 mg q6h PRN for fever   -  D/C Hydroxychloroquine   - Will reassess o2 sat on RM and ambulation and discharge   - ID : No need of ABx for Covid. No PNA    DVT ppx  Diet regulat  Dispo: home    ***PATIENT IS COVID NEGATIVE FROM 3/29/2020***  ***COVID SWAB REPEATED ON 3/30/2020*** Patient is a 70y old  Male who presents with a chief complaint of cough, shortness of breath     Acute Hypoxic respiratory failure - most likely viral   - symptoms of 2 weeks duration (COVID-19 negative twice)  - Titrating oxygen down. This morning patient was on RA and saturating 95%. Try to check O2 sat on ambulation and if tolerated will discharge  - Chest x ray showed right middle and lower lobe opacity  - started Azithromycin 500mg for 5 days from 4/1 to 4/5  - Continue IVF with LR @ 75 mL/h, will stop on discharge   - Tylenol 650 mg q6h PRN for fever   -  D/C Hydroxychloroquine   - Will reassess o2 sat on RM and ambulation and discharge  - Albuterol Nebs  - Guanfecin bid   - ID : No need of ABx for Covid. No PNA    DVT ppx  Diet regulat  Dispo: home    ***PATIENT IS COVID NEGATIVE FROM 3/29/2020***  ***COVID SWAB REPEATED ON 3/30/2020*** Patient is a 70y old  Male who presents with a chief complaint of cough, shortness of breath     Acute Hypoxic respiratory failure - most likely viral   - symptoms of 2 weeks duration (COVID-19 negative twice)  - Titrating oxygen down. This morning patient was on RA and saturating 95%. Try to check O2 sat on ambulation and if tolerated will discharge  - Chest x ray showed right middle and lower lobe opacity  - started Azithromycin 500mg for 5 days from 4/1 to 4/5  - Continue IVF with LR @ 75 mL/h, will stop on discharge   - Tylenol 650 mg q6h PRN for fever   -  D/C Hydroxychloroquine   - Will reassess o2 sat on RM and ambulation and discharge  - Albuterol Nebs  - GuaiFENesin  milliGRAM(s) Oral every 12 hours  - ID : No need of ABx for Covid. No PNA    DVT ppx  Diet regulat  Dispo: home    ***PATIENT IS COVID NEGATIVE FROM 3/29/2020***  ***COVID SWAB REPEATED ON 3/30/2020*** Patient is a 70y old  Male who presents with a chief complaint of cough, shortness of breath     Acute Hypoxic respiratory failure - most likely viral   - symptoms of 2 weeks duration (COVID-19 negative twice)  - Titrating oxygen down. This morning patient was on RA and saturating 95%.   - But on PT Pt walked 30 feet x 2; SPO2 on RA during ambulation become 84% and while rest 91%.  - Chest x ray showed right middle and lower lobe opacity  - started Azithromycin 500mg for 5 days from 4/1 to 4/5  - Continue IVF with LR @ 75 mL/h, will stop on discharge   - Tylenol 650 mg q6h PRN for fever   -  D/C Hydroxychloroquine   - Will reassess o2 sat on RM and ambulation and discharge  - Albuterol Nebs  - GuaiFENesin  milliGRAM(s) Oral every 12 hours  - ID : No need of ABx for Covid. No PNA    DVT ppx  Diet regulat  Dispo: home    ***PATIENT IS COVID NEGATIVE FROM 3/29/2020***  ***COVID SWAB REPEATED ON 3/30/2020***

## 2020-04-03 VITALS
HEART RATE: 79 BPM | TEMPERATURE: 98 F | RESPIRATION RATE: 18 BRPM | SYSTOLIC BLOOD PRESSURE: 117 MMHG | DIASTOLIC BLOOD PRESSURE: 69 MMHG

## 2020-04-03 PROCEDURE — 99232 SBSQ HOSP IP/OBS MODERATE 35: CPT

## 2020-04-03 RX ORDER — AZITHROMYCIN 500 MG/1
1 TABLET, FILM COATED ORAL
Qty: 2 | Refills: 0
Start: 2020-04-03 | End: 2020-04-04

## 2020-04-03 RX ORDER — DONEPEZIL HYDROCHLORIDE 10 MG/1
1 TABLET, FILM COATED ORAL
Qty: 60 | Refills: 0
Start: 2020-04-03 | End: 2020-05-02

## 2020-04-03 RX ADMIN — Medication 600 MILLIGRAM(S): at 15:36

## 2020-04-03 RX ADMIN — Medication 600 MILLIGRAM(S): at 06:49

## 2020-04-03 RX ADMIN — ENOXAPARIN SODIUM 40 MILLIGRAM(S): 100 INJECTION SUBCUTANEOUS at 11:01

## 2020-04-03 RX ADMIN — AZITHROMYCIN 500 MILLIGRAM(S): 500 TABLET, FILM COATED ORAL at 11:01

## 2020-04-03 NOTE — PROGRESS NOTE ADULT - ASSESSMENT
Patient is a 70y old  Male who presents with a chief complaint of cough, shortness of breath     Acute Hypoxic respiratory failure - most likely viral   - symptoms of 2 weeks duration (COVID-19 negative twice)  - Titrating oxygen down. This morning patient was on RA and saturating 95%.   - But on PT Pt walked 30 feet x 2; SPO2 on RA during ambulation become 84% and while rest 91%.  - Chest x ray showed right middle and lower lobe opacity  - started Azithromycin 500mg for 5 days from 4/1 to 4/5  - Continue IVF with LR @ 50 mL/h, will stop on discharge   - Tylenol 650 mg q6h PRN for fever   -  D/C Hydroxychloroquine   - Will reassess o2 sat on RM and ambulation and discharge  - Albuterol Nebs  - GuaiFENesin  milliGRAM(s) Oral every 12 hours  - ID : No need of ABx for Covid. No PNA    DVT ppx  Diet regulat  Dispo: home    ***PATIENT IS COVID NEGATIVE FROM 3/29/2020***  ***COVID SWAB REPEATED ON 3/30/2020***

## 2020-04-03 NOTE — PROGRESS NOTE ADULT - PROVIDER SPECIALTY LIST ADULT
Hospitalist
Infectious Disease
Internal Medicine

## 2020-04-03 NOTE — PROGRESS NOTE ADULT - ASSESSMENT
Patient is a 70y old  Male who presents with a chief complaint of cough, shortness of breath, COVID 19 - negative   PATIENT IS COVID NEGATIVE FROM 3/29/2020  COVID SWAB NEGATIVE ON REPEAT AT 3/30/2020    A/P   # Acute Hypoxic respiratory failure/ PNA ( viral vs community acquired ) / COVID 19 was ruled out   - pt is comfortable on RA on ambulation today   - on  Albuterol Q 4 hours, Mucinex   - consulted by ID, off  Hydroxychloroquine  - c/w Zithromax to complete the 5 days course   - supportive/ symptomatic treatment       # DVT prophylaxis     # Deconditioning / cognitive decline   - pt is ambulating with PT     #Progress Note Handoff  Pending (specify):  none   Family discussion: case d/w son, he wants his father to go to his house in [place], but pt wants to go home to [place]   Disposition: pt is stable for discharge, case management to follow up with family today

## 2020-04-03 NOTE — PROGRESS NOTE ADULT - SUBJECTIVE AND OBJECTIVE BOX
LENGTH OF HOSPITAL STAY: 5d    CHIEF COMPLAINT:   Patient is a 70y old  Male who presents with a chief complaint of cough, shortness of breath (02 Apr 2020 11:17)      Overnight events:    No acute events overnight    ALLERGIES:  No Known Allergies    MEDICATIONS:  STANDING MEDICATIONS  ALBUTerol    0.083% 2.5 milliGRAM(s) Nebulizer every 6 hours  azithromycin   Tablet 500 milliGRAM(s) Oral daily  enoxaparin Injectable 40 milliGRAM(s) SubCutaneous daily  guaiFENesin  milliGRAM(s) Oral every 12 hours  sodium chloride 0.9%. 1000 milliLiter(s) IV Continuous <Continuous>    PRN MEDICATIONS  acetaminophen   Tablet .. 650 milliGRAM(s) Oral every 6 hours PRN    VITALS:   T(F): 96.8  HR: 81  BP: 137/70  RR: 18  SpO2: 91%    LABS:                        12.3   4.37  )-----------( 220      ( 02 Apr 2020 05:44 )             36.4     04-02    138  |  101  |  6<L>  ----------------------------<  97  4.0   |  25  |  0.6<L>    Ca    8.1<L>      02 Apr 2020 05:44    TPro  5.8<L>  /  Alb  2.8<L>  /  TBili  0.7  /  DBili  x   /  AST  25  /  ALT  19  /  AlkPhos  49  04-02                    Cultures:      RADIOLOGY:    PHYSICAL EXAM:  GEN: No acute distress  HEENT: RAJ  LUNGS: Clear to auscultation bilaterally   HEART: S1/S2 present. RRR.   ABD: Soft, non-tender, non-distended. Bowel sounds present  EXT: non edematous, non erythematous  NEURO: AAOX3

## 2020-04-03 NOTE — PROGRESS NOTE ADULT - SUBJECTIVE AND OBJECTIVE BOX
Patient is a 70y old  Male who presents with a chief complaint of cough, shortness of breath (31 Mar 2020 13:49)  Patient has been having fever, dry cough for the last 2 weeks. Patient has been having shortness of breath on minimal exertion for the last week associated with high grade fevers, loss of appetite and myalgias. no c/o chest pain, hemoptysis. Patient has no travel outside the [place] in last few weeks but he traveled to [place] in September 2019. He only goes out for grocery shopping.   He c/o cough with poor appetite and no sense of  taste.  1st test for covid was neg on 3/29/2020.   He was seen by ID on 3/30, recommended no ABX -- Plaquenil and azithromycin were both d/c'd, but repeat CXR showed worsening PNA, pt was restarted on Zithromax on 4/1     Today pt feels good today, denies any complaints, asking about discharge.     ALLERGIES:  No Known Allergies    VITALS:   T(C): 36 (03 Apr 2020 04:00), Max: 36.9 (02 Apr 2020 20:14)  T(F): 96.8 (03 Apr 2020 04:00), Max: 98.5 (02 Apr 2020 20:14)  HR: 81 (03 Apr 2020 04:00) (81 - 97)  BP: 137/70 (03 Apr 2020 04:00) (115/66 - 139/71)  BP(mean): --  RR: 18 (03 Apr 2020 04:00) (18 - 19)  SpO2: 91% (02 Apr 2020 11:25) (91% - 91%)    PHYSICAL EXAM:  GEN: No acute distress  HEENT: RAJ  LUNGS: Clear to auscultation bilaterally   HEART: S1/S2 present. RRR.   ABD: Soft, non-tender, non-distended. Bowel sounds present  EXT: non edematous, non erythematous  NEURO: AAOX3, no focal neuro deficit     LABS:                              12.3   4.37  )-----------( 220      ( 02 Apr 2020 05:44 )             36.4   04-02    138  |  101  |  6<L>  ----------------------------<  97  4.0   |  25  |  0.6<L>    Ca    8.1<L>      02 Apr 2020 05:44    TPro  5.8<L>  /  Alb  2.8<L>  /  TBili  0.7  /  DBili  x   /  AST  25  /  ALT  19  /  AlkPhos  49  04-02        Cultures:    Culture - Blood (03.29.20 @ 09:14)    Specimen Source: .Blood Blood    Culture Results:   No growth to date.    COVID-19 PCR . (03.30.20 @ 19:25)    COVID-19 PCR: NotDetec: This test has been validated by [employer] to be accurate;  though it has not been FDA cleared/approved by the usual pathway.  As with all laboratory tests, results should be correlated with clinical  findings.    RADIOLOGY:  < from: Xray Chest 1 View-PORTABLE IMMEDIATE (03.29.20 @ 09:58) >  Impression:      Peripheral patchy right mid to lower lung field opacity. Findings are consistent with pneumonia in the proper clinical setting    < from: Xray Chest 1 View-PORTABLE IMMEDIATE (04.01.20 @ 14:56) >  Impression:      Low lung volume. Interval increase in right lung opacities.    < end of copied text >        MEDICATIONS  (STANDING):  ALBUTerol    0.083% 2.5 milliGRAM(s) Nebulizer every 6 hours  azithromycin   Tablet 500 milliGRAM(s) Oral daily  enoxaparin Injectable 40 milliGRAM(s) SubCutaneous daily  guaiFENesin  milliGRAM(s) Oral every 12 hours  sodium chloride 0.9%. 1000 milliLiter(s) (75 mL/Hr) IV Continuous <Continuous>    MEDICATIONS  (PRN):  acetaminophen   Tablet .. 650 milliGRAM(s) Oral every 6 hours PRN Temp greater or equal to 38C (100.4F), Moderate Pain (4 - 6)

## 2020-04-03 NOTE — DISCHARGE NOTE NURSING/CASE MANAGEMENT/SOCIAL WORK - PATIENT PORTAL LINK FT
You can access the FollowMyHealth Patient Portal offered by [facility] by registering at the following website: [email or web address]. By joining [employer]’s FollowMyHealth portal, you will also be able to view your health information using other applications (apps) compatible with our system.

## 2020-04-03 NOTE — PROGRESS NOTE ADULT - REASON FOR ADMISSION
cough, shortness of breath

## 2020-04-07 DIAGNOSIS — Z87.891 PERSONAL HISTORY OF NICOTINE DEPENDENCE: ICD-10-CM

## 2020-04-07 DIAGNOSIS — Z03.818 ENCOUNTER FOR OBSERVATION FOR SUSPECTED EXPOSURE TO OTHER BIOLOGICAL AGENTS RULED OUT: ICD-10-CM

## 2020-04-07 DIAGNOSIS — J96.01 ACUTE RESPIRATORY FAILURE WITH HYPOXIA: ICD-10-CM

## 2020-04-07 DIAGNOSIS — I10 ESSENTIAL (PRIMARY) HYPERTENSION: ICD-10-CM

## 2020-04-07 DIAGNOSIS — E78.5 HYPERLIPIDEMIA, UNSPECIFIED: ICD-10-CM

## 2020-04-07 DIAGNOSIS — R05 COUGH: ICD-10-CM

## 2020-04-07 DIAGNOSIS — J12.9 VIRAL PNEUMONIA, UNSPECIFIED: ICD-10-CM

## 2023-05-14 NOTE — DISCHARGE NOTE PROVIDER - NSDCMRMEDTOKEN_GEN_ALL_CORE_FT
declines
acetaminophen 325 mg oral tablet: 2 tab(s) orally every 6 hours, As needed, Temp greater or equal to 38C (100.4F), Moderate Pain (4 - 6)  azithromycin 500 mg oral tablet: 1 tab(s) orally once a day for 2 more days  donepezil 10 mg oral tablet: 1 tab(s) orally 2 times a day   guaiFENesin 600 mg oral tablet, extended release: 1 tab(s) orally every 12 hours